# Patient Record
Sex: MALE | Race: ASIAN | NOT HISPANIC OR LATINO | Employment: FULL TIME | URBAN - METROPOLITAN AREA
[De-identification: names, ages, dates, MRNs, and addresses within clinical notes are randomized per-mention and may not be internally consistent; named-entity substitution may affect disease eponyms.]

---

## 2019-12-31 ENCOUNTER — OFFICE VISIT (OUTPATIENT)
Dept: UROLOGY | Facility: AMBULATORY SURGERY CENTER | Age: 39
End: 2019-12-31
Payer: COMMERCIAL

## 2019-12-31 VITALS
SYSTOLIC BLOOD PRESSURE: 120 MMHG | WEIGHT: 187 LBS | HEART RATE: 68 BPM | BODY MASS INDEX: 27.62 KG/M2 | DIASTOLIC BLOOD PRESSURE: 80 MMHG

## 2019-12-31 DIAGNOSIS — Z30.2 ENCOUNTER FOR STERILIZATION: Primary | ICD-10-CM

## 2019-12-31 PROCEDURE — 99204 OFFICE O/P NEW MOD 45 MIN: CPT | Performed by: UROLOGY

## 2019-12-31 RX ORDER — LORAZEPAM 2 MG/1
2 TABLET ORAL ONCE
Qty: 1 TABLET | Refills: 0 | Status: SHIPPED | OUTPATIENT
Start: 2019-12-31 | End: 2019-12-31 | Stop reason: CLARIF

## 2019-12-31 RX ORDER — LORAZEPAM 2 MG/1
2 TABLET ORAL ONCE
Qty: 1 TABLET | Refills: 0 | Status: SHIPPED | OUTPATIENT
Start: 2019-12-31 | End: 2019-12-31

## 2019-12-31 NOTE — PROGRESS NOTES
12/31/2019    Kary Come  1980  [id number]      My impression is request for elective sterilization vasectomy  Risk and benefits of the procedure were discussed and reviewed  Informed consent was obtained in the office today  The patient was prescribed Ativan to take one hour prior to the procedure  He understands that he will require transportation to and from the office that day  He also understands he will require 2 semen analyses at 6 and 8 weeks post procedure  In the interim he will require contraception to avoid an undesired pregnancy  History of Present Illness  35 y o  male who requests elective sterilization with vasectomy  He is  with 2 children  He and his wife desire no further children  He denies any lower urinary tract symptoms  He is otherwise healthy  He does report a history of an undescended right testicle status post right orchiopexy  AUA Symptom Score      Review of Systems  Review of Systems   Constitutional: Negative  HENT: Negative  Eyes: Negative  Respiratory: Negative  Cardiovascular: Negative  Gastrointestinal: Negative  Endocrine: Negative  Genitourinary: Negative  Musculoskeletal: Negative  Skin: Negative  Allergic/Immunologic: Negative  Neurological: Negative  Hematological: Negative  Psychiatric/Behavioral: Negative  Past Medical History  History reviewed  No pertinent past medical history  Past Social History  Past Surgical History:   Procedure Laterality Date    ELBOW SURGERY      KNEE SURGERY      UNDESCENDED TESTICLE EXPLORATION         Past Family History  History reviewed  No pertinent family history      Past Social history  Social History     Socioeconomic History    Marital status: /Civil Union     Spouse name: Not on file    Number of children: Not on file    Years of education: Not on file    Highest education level: Not on file   Occupational History    Not on file Social Needs    Financial resource strain: Not on file    Food insecurity:     Worry: Not on file     Inability: Not on file    Transportation needs:     Medical: Not on file     Non-medical: Not on file   Tobacco Use    Smoking status: Current Every Day Smoker    Smokeless tobacco: Never Used   Substance and Sexual Activity    Alcohol use: Yes     Frequency: Monthly or less    Drug use: Not Currently    Sexual activity: Not on file   Lifestyle    Physical activity:     Days per week: Not on file     Minutes per session: Not on file    Stress: Not on file   Relationships    Social connections:     Talks on phone: Not on file     Gets together: Not on file     Attends Protestant service: Not on file     Active member of club or organization: Not on file     Attends meetings of clubs or organizations: Not on file     Relationship status: Not on file    Intimate partner violence:     Fear of current or ex partner: Not on file     Emotionally abused: Not on file     Physically abused: Not on file     Forced sexual activity: Not on file   Other Topics Concern    Not on file   Social History Narrative    Not on file       Current Medications  Current Outpatient Medications   Medication Sig Dispense Refill    LORazepam (ATIVAN) 2 mg tablet Take 1 tablet (2 mg total) by mouth once for 1 dose Take one hour prior to procedure 1 tablet 0     No current facility-administered medications for this visit  Allergies  No Known Allergies    Past Medical History, Social History, Family History, medications and allergies were reviewed  Vitals  Vitals:    12/31/19 1139   BP: 120/80   Pulse: 68   Weight: 84 8 kg (187 lb)       Physical Exam  Physical Exam    On examination he is in no acute distress  His abdomen is soft nontender nondistended   examination reveals a normal left testes  The left vas is palpable  The right testicle is quite small  This is the side of the undescended testicle    There is a palpable right vas  Skin is warm  Extremities without edema    Neurologic is grossly intact and nonfocal   Gait normal   Affect normal    Results  No results found for: PSA  No results found for: GLUCOSE, CALCIUM, NA, K, CO2, CL, BUN, CREATININE  No results found for: WBC, HGB, HCT, MCV, PLT      Office Urine Dip  No results found for this or any previous visit (from the past 1 hour(s)) ]

## 2020-02-28 ENCOUNTER — PROCEDURE VISIT (OUTPATIENT)
Dept: UROLOGY | Facility: AMBULATORY SURGERY CENTER | Age: 40
End: 2020-02-28
Payer: COMMERCIAL

## 2020-02-28 VITALS
SYSTOLIC BLOOD PRESSURE: 130 MMHG | BODY MASS INDEX: 28.79 KG/M2 | HEART RATE: 73 BPM | HEIGHT: 69 IN | DIASTOLIC BLOOD PRESSURE: 80 MMHG | WEIGHT: 194.4 LBS

## 2020-02-28 DIAGNOSIS — Z30.2 ENCOUNTER FOR STERILIZATION: Primary | ICD-10-CM

## 2020-02-28 PROCEDURE — 88302 TISSUE EXAM BY PATHOLOGIST: CPT | Performed by: PATHOLOGY

## 2020-02-28 PROCEDURE — 55250 REMOVAL OF SPERM DUCT(S): CPT | Performed by: UROLOGY

## 2020-02-28 NOTE — PROGRESS NOTES
Vasectomy     Date/Time 2/28/2020 3:12 PM     Performed by  Chu Bennett MD     Authorized by Chu Bennett MD            Luis F Ramires is a 79-year-old male  with 2 children  He requests elective sterilization with vasectomy  Risk and benefits of the procedure were discussed and reviewed  Informed consent was previously obtained  The patient had taken Ativan as prescribed  Vasectomy Procedure Note:    Risks and benefits of vasectomy were previously discussed  Informed consent was obtained at his prior office visit  The patient had taken Ativan as prescribed  The patient was placed in the supine position  His genitalia was prepped and draped in a sterile fashion  The left vas deferens was grasped and presented to the midline of the scrotum  2% lidocaine was used to anesthetize the skin, subcutaneous tissue, and left vas deferens  A scalpeless opening was made in the midline of the scrotum  The left vas deferens was grasped and presented into the surgical field  A #15 blade was used to make a longitudinal incision in the sheath of the vas deferens  The vas itself was then dissected free from the surrounding tissue  Clamps were placed proximally and distally and a 1 cm segment of the vas deferens was excised  Silk ties were applied and exposed ends were fulgurated  Hemostasis was excellent  The vas was returned to its natural anatomic position and the same procedure was then repeated on the patient's right side  A single stitch was placed through the skin and dartos to reapproximate the defect  Bacitracin ointment was applied  The patient is status post vasectomy  I recommended rest, ice, and scrotal support  I've asked that he return in the next 2-3 weeks for a  post vasectomy check  Tylenol/Advil as needed for pain  The patient understands that he is not yet considered sterile  I have recommended semen analyses at 6 and 8 weeks post procedure   In the interim I have recommended contraception to avoid an undesired pregnancy

## 2020-04-14 ENCOUNTER — TELEPHONE (OUTPATIENT)
Dept: UROLOGY | Facility: AMBULATORY SURGERY CENTER | Age: 40
End: 2020-04-14

## 2020-04-15 ENCOUNTER — TELEMEDICINE (OUTPATIENT)
Dept: UROLOGY | Facility: AMBULATORY SURGERY CENTER | Age: 40
End: 2020-04-15

## 2020-04-15 DIAGNOSIS — Z98.52 STATUS POST VASECTOMY: Primary | ICD-10-CM

## 2020-04-15 PROCEDURE — 99024 POSTOP FOLLOW-UP VISIT: CPT | Performed by: NURSE PRACTITIONER
